# Patient Record
Sex: FEMALE | Race: WHITE | NOT HISPANIC OR LATINO
[De-identification: names, ages, dates, MRNs, and addresses within clinical notes are randomized per-mention and may not be internally consistent; named-entity substitution may affect disease eponyms.]

---

## 2019-02-21 ENCOUNTER — RESULT REVIEW (OUTPATIENT)
Age: 39
End: 2019-02-21

## 2021-03-26 ENCOUNTER — RESULT REVIEW (OUTPATIENT)
Age: 41
End: 2021-03-26

## 2021-04-13 ENCOUNTER — APPOINTMENT (OUTPATIENT)
Dept: HEART AND VASCULAR | Facility: CLINIC | Age: 41
End: 2021-04-13
Payer: COMMERCIAL

## 2021-04-13 ENCOUNTER — NON-APPOINTMENT (OUTPATIENT)
Age: 41
End: 2021-04-13

## 2021-04-13 VITALS
SYSTOLIC BLOOD PRESSURE: 128 MMHG | RESPIRATION RATE: 14 BRPM | DIASTOLIC BLOOD PRESSURE: 88 MMHG | OXYGEN SATURATION: 98 % | HEART RATE: 80 BPM | TEMPERATURE: 98.2 F | WEIGHT: 293 LBS | BODY MASS INDEX: 41.95 KG/M2 | HEIGHT: 70 IN

## 2021-04-13 DIAGNOSIS — Z87.42 PERSONAL HISTORY OF OTHER DISEASES OF THE FEMALE GENITAL TRACT: ICD-10-CM

## 2021-04-13 DIAGNOSIS — R03.0 ELEVATED BLOOD-PRESSURE READING, W/OUT DIAGNOSIS OF HYPERTENSION: ICD-10-CM

## 2021-04-13 DIAGNOSIS — Z86.39 PERSONAL HISTORY OF OTHER ENDOCRINE, NUTRITIONAL AND METABOLIC DISEASE: ICD-10-CM

## 2021-04-13 DIAGNOSIS — F41.9 ANXIETY DISORDER, UNSPECIFIED: ICD-10-CM

## 2021-04-13 DIAGNOSIS — R06.00 DYSPNEA, UNSPECIFIED: ICD-10-CM

## 2021-04-13 DIAGNOSIS — Z78.9 OTHER SPECIFIED HEALTH STATUS: ICD-10-CM

## 2021-04-13 DIAGNOSIS — R00.2 PALPITATIONS: ICD-10-CM

## 2021-04-13 PROCEDURE — 99072 ADDL SUPL MATRL&STAF TM PHE: CPT

## 2021-04-13 PROCEDURE — 99204 OFFICE O/P NEW MOD 45 MIN: CPT

## 2021-04-13 PROCEDURE — 93246 EXT ECG>7D<15D RECORDING: CPT

## 2021-04-13 PROCEDURE — 93000 ELECTROCARDIOGRAM COMPLETE: CPT | Mod: 59

## 2021-04-13 RX ORDER — LEVOTHYROXINE SODIUM 0.14 MG/1
137 TABLET ORAL
Qty: 90 | Refills: 0 | Status: ACTIVE | COMMUNITY
Start: 2021-01-29

## 2021-04-13 NOTE — PHYSICAL EXAM
[General Appearance - Well Developed] : well developed [Normal Appearance] : normal appearance [Well Groomed] : well groomed [General Appearance - Well Nourished] : well nourished [No Deformities] : no deformities [General Appearance - In No Acute Distress] : no acute distress [Normal Oral Mucosa] : normal oral mucosa [No Oral Pallor] : no oral pallor [No Oral Cyanosis] : no oral cyanosis [Heart Rate And Rhythm] : heart rate and rhythm were normal [Heart Sounds] : normal S1 and S2 [Murmurs] : no murmurs present [Respiration, Rhythm And Depth] : normal respiratory rhythm and effort [Exaggerated Use Of Accessory Muscles For Inspiration] : no accessory muscle use [Auscultation Breath Sounds / Voice Sounds] : lungs were clear to auscultation bilaterally [Abdomen Soft] : soft [Abdomen Tenderness] : non-tender [] : no hepato-splenomegaly [Abdomen Mass (___ Cm)] : no abdominal mass palpated [Oriented To Time, Place, And Person] : oriented to person, place, and time [Affect] : the affect was normal [Mood] : the mood was normal [No Anxiety] : not feeling anxious

## 2021-04-13 NOTE — DISCUSSION/SUMMARY
[FreeTextEntry1] : 40 year old F with history of Hypothyroidism, PCOS, Obesity, Anxiety here for evaluation of palpitations. She gets them intermittently. Gets occasional dyspnea on exertion.\par - Check TSH and labs\par - Stress echocardiogram\par - Ziopatch (placed on patient today). \par - Return after testing completed

## 2021-04-13 NOTE — REASON FOR VISIT
[Initial Evaluation] : an initial evaluation of [Palpitations] : palpitations [FreeTextEntry1] : 40 year old F with history of Hypothyroidism, PCOS, Obesity, Anxiety here for evaluation of palpitations. She gets them intermittently. Gets occasional dyspnea on exertion. No chest pain. Exercises on the peloton 3 times a week. \par \par EKG today NSR without STT abnormalities.

## 2021-04-27 ENCOUNTER — TRANSCRIPTION ENCOUNTER (OUTPATIENT)
Age: 41
End: 2021-04-27

## 2021-05-05 ENCOUNTER — NON-APPOINTMENT (OUTPATIENT)
Age: 41
End: 2021-05-05

## 2021-05-26 ENCOUNTER — APPOINTMENT (OUTPATIENT)
Dept: HEART AND VASCULAR | Facility: CLINIC | Age: 41
End: 2021-05-26
Payer: COMMERCIAL

## 2021-05-26 VITALS
HEART RATE: 76 BPM | DIASTOLIC BLOOD PRESSURE: 84 MMHG | SYSTOLIC BLOOD PRESSURE: 126 MMHG | BODY MASS INDEX: 41.95 KG/M2 | TEMPERATURE: 98 F | HEIGHT: 70 IN | WEIGHT: 293 LBS | OXYGEN SATURATION: 98 %

## 2021-05-26 PROCEDURE — 93320 DOPPLER ECHO COMPLETE: CPT

## 2021-05-26 PROCEDURE — 99072 ADDL SUPL MATRL&STAF TM PHE: CPT

## 2021-05-26 PROCEDURE — 93351 STRESS TTE COMPLETE: CPT

## 2021-05-26 PROCEDURE — 93325 DOPPLER ECHO COLOR FLOW MAPG: CPT

## 2021-05-27 ENCOUNTER — NON-APPOINTMENT (OUTPATIENT)
Age: 41
End: 2021-05-27

## 2021-05-27 LAB
ALBUMIN SERPL ELPH-MCNC: 4.4 G/DL
ALP BLD-CCNC: 81 U/L
ALT SERPL-CCNC: 21 U/L
ANION GAP SERPL CALC-SCNC: 15 MMOL/L
AST SERPL-CCNC: 17 U/L
BASOPHILS # BLD AUTO: 0.07 K/UL
BASOPHILS NFR BLD AUTO: 1 %
BILIRUB SERPL-MCNC: 0.3 MG/DL
BUN SERPL-MCNC: 12 MG/DL
CALCIUM SERPL-MCNC: 9.6 MG/DL
CHLORIDE SERPL-SCNC: 102 MMOL/L
CHOLEST SERPL-MCNC: 215 MG/DL
CO2 SERPL-SCNC: 22 MMOL/L
CREAT SERPL-MCNC: 0.6 MG/DL
EOSINOPHIL # BLD AUTO: 0.41 K/UL
EOSINOPHIL NFR BLD AUTO: 5.8 %
GLUCOSE SERPL-MCNC: 92 MG/DL
HCT VFR BLD CALC: 43.4 %
HDLC SERPL-MCNC: 66 MG/DL
HGB BLD-MCNC: 13.5 G/DL
IMM GRANULOCYTES NFR BLD AUTO: 0.4 %
LDLC SERPL CALC-MCNC: 125 MG/DL
LYMPHOCYTES # BLD AUTO: 2.1 K/UL
LYMPHOCYTES NFR BLD AUTO: 29.5 %
MAN DIFF?: NORMAL
MCHC RBC-ENTMCNC: 28.7 PG
MCHC RBC-ENTMCNC: 31.1 GM/DL
MCV RBC AUTO: 92.3 FL
MONOCYTES # BLD AUTO: 0.62 K/UL
MONOCYTES NFR BLD AUTO: 8.7 %
NEUTROPHILS # BLD AUTO: 3.9 K/UL
NEUTROPHILS NFR BLD AUTO: 54.6 %
NONHDLC SERPL-MCNC: 150 MG/DL
PLATELET # BLD AUTO: 273 K/UL
POTASSIUM SERPL-SCNC: 3.9 MMOL/L
PROT SERPL-MCNC: 7.1 G/DL
RBC # BLD: 4.7 M/UL
RBC # FLD: 13.3 %
SODIUM SERPL-SCNC: 139 MMOL/L
TRIGL SERPL-MCNC: 122 MG/DL
TSH SERPL-ACNC: 1.75 UIU/ML
WBC # FLD AUTO: 7.13 K/UL

## 2021-05-28 ENCOUNTER — NON-APPOINTMENT (OUTPATIENT)
Age: 41
End: 2021-05-28

## 2021-06-08 ENCOUNTER — APPOINTMENT (OUTPATIENT)
Dept: HEART AND VASCULAR | Facility: CLINIC | Age: 41
End: 2021-06-08
Payer: COMMERCIAL

## 2021-06-08 VITALS
RESPIRATION RATE: 16 BRPM | BODY MASS INDEX: 41.95 KG/M2 | SYSTOLIC BLOOD PRESSURE: 122 MMHG | TEMPERATURE: 97.6 F | WEIGHT: 293 LBS | DIASTOLIC BLOOD PRESSURE: 76 MMHG | HEIGHT: 70 IN | OXYGEN SATURATION: 98 % | HEART RATE: 70 BPM

## 2021-06-08 PROCEDURE — 99214 OFFICE O/P EST MOD 30 MIN: CPT

## 2021-06-08 PROCEDURE — 99072 ADDL SUPL MATRL&STAF TM PHE: CPT

## 2021-06-08 NOTE — DISCUSSION/SUMMARY
[FreeTextEntry1] : 40 year old F with history of Hypothyroidism, PCOS, Obesity, Anxiety with palpitations and dyspnea. Abnormal SE with basal inferoseptal region WMA post exercise. One small territory with atypical symptoms. \par - Will continue to monitor for now. Interpretation may be secondary to technically limited study. \par - will monitor symptoms. Reassurance provided for palpitations. \par - Weight loss goal of 15 lbs in 3 months. \par - diet/weight loss/exercise. \par - Return in 3 months

## 2021-06-08 NOTE — REVIEW OF SYSTEMS
[Negative] : Heme/Lymph [FreeTextEntry2] : except as above.  [FreeTextEntry5] : except as above.  [FreeTextEntry6] : except as above.

## 2021-06-08 NOTE — REASON FOR VISIT
[Initial Evaluation] : an initial evaluation of [Palpitations] : palpitations [FreeTextEntry1] : 40 year old F with history of Hypothyroidism, PCOS, Obesity, Anxiety here for followup for workup for palpitations. She gets them intermittently. Gets occasional dyspnea on exertion. No chest pain. Exercises on the peloton 3 times a week. she has lost 3 lbs intentionally since May 3rd. \par \par EKG 04/13/2021: NSR without STT abnormalities. \par \par Labs May 26 2021: TSH 0.75 (nl); CBC WNL; Total chol 215; ; ; HDL 66; CMP WNL\par ACTH 33.6; Cortisol 15.1\par \par May 26 2021: Phillip protocol 7 min; reached 101% of MPHR, Delayed HRR; No EKG changes. + WMA in basal inferoseptal region. \par \par Holter Zio 5 days 04/2021: Average HR 82 bpm with rare PVC and APC\par Resting echo: Trace MR, LVEF 60%; Trace TR/GA\par

## 2021-09-28 ENCOUNTER — APPOINTMENT (OUTPATIENT)
Dept: HEART AND VASCULAR | Facility: CLINIC | Age: 41
End: 2021-09-28
Payer: COMMERCIAL

## 2021-09-28 VITALS
SYSTOLIC BLOOD PRESSURE: 122 MMHG | HEART RATE: 84 BPM | TEMPERATURE: 97.2 F | DIASTOLIC BLOOD PRESSURE: 76 MMHG | OXYGEN SATURATION: 99 % | BODY MASS INDEX: 41.95 KG/M2 | WEIGHT: 293 LBS | HEIGHT: 70 IN | RESPIRATION RATE: 16 BRPM

## 2021-09-28 DIAGNOSIS — Z00.00 ENCOUNTER FOR GENERAL ADULT MEDICAL EXAMINATION W/OUT ABNORMAL FINDINGS: ICD-10-CM

## 2021-09-28 PROCEDURE — 99214 OFFICE O/P EST MOD 30 MIN: CPT

## 2021-09-28 PROCEDURE — 99072 ADDL SUPL MATRL&STAF TM PHE: CPT

## 2021-09-28 RX ORDER — METFORMIN ER 500 MG 500 MG/1
500 TABLET ORAL
Qty: 60 | Refills: 0 | Status: DISCONTINUED | COMMUNITY
Start: 2021-03-08 | End: 2021-09-28

## 2021-09-28 NOTE — DISCUSSION/SUMMARY
[FreeTextEntry1] : 40 year old F with history of Hypothyroidism, PCOS, Obesity, Anxiety with palpitations and dyspnea. Abnormal SE with basal inferoseptal region WMA post exercise. One small territory with atypical symptoms. \par - Will continue to monitor for now. Interpretation may be secondary to technically limited study. \par - will monitor symptoms. Reassurance provided for palpitations. \par - Weight loss goal of 15 lbs in 3 months. Will check labs today (fasting). Will try to find Nutritionist for her. \par - diet/weight loss/exercise. \par - Return in 6 months

## 2021-09-28 NOTE — REASON FOR VISIT
[Initial Evaluation] : an initial evaluation of [Palpitations] : palpitations [FreeTextEntry1] : 40 year old F with history of Hypothyroidism, PCOS, Obesity, Anxiety here for followup. No chest pain. Exercises on the peloton 3 times a week. She is no longer taking Metformin as she feels it didn’t help with weight loss. She has not had any weight loss. \par \par EKG 04/13/2021: NSR without STT abnormalities. \par \par Labs May 26 2021: TSH 0.75 (nl); CBC WNL; Total chol 215; ; ; HDL 66; CMP WNL\par ACTH 33.6; Cortisol 15.1\par \par May 26 2021: Phillip protocol 7 min; reached 101% of MPHR, Delayed HRR; No EKG changes. + WMA in basal inferoseptal region. \par \par Holter Zio 5 days 04/2021: Average HR 82 bpm with rare PVC and APC\par Resting echo: Trace MR, LVEF 60%; Trace TR/CA\par

## 2021-09-29 ENCOUNTER — NON-APPOINTMENT (OUTPATIENT)
Age: 41
End: 2021-09-29

## 2021-09-29 LAB
ALBUMIN SERPL ELPH-MCNC: 4.5 G/DL
ALP BLD-CCNC: 92 U/L
ALT SERPL-CCNC: 16 U/L
ANION GAP SERPL CALC-SCNC: 13 MMOL/L
AST SERPL-CCNC: 14 U/L
BASOPHILS # BLD AUTO: 0.06 K/UL
BASOPHILS NFR BLD AUTO: 1 %
BILIRUB SERPL-MCNC: 0.2 MG/DL
BUN SERPL-MCNC: 12 MG/DL
CALCIUM SERPL-MCNC: 9.3 MG/DL
CHLORIDE SERPL-SCNC: 104 MMOL/L
CHOLEST SERPL-MCNC: 201 MG/DL
CO2 SERPL-SCNC: 23 MMOL/L
CREAT SERPL-MCNC: 0.59 MG/DL
EOSINOPHIL # BLD AUTO: 0.44 K/UL
EOSINOPHIL NFR BLD AUTO: 7.4 %
GLUCOSE SERPL-MCNC: 106 MG/DL
HCT VFR BLD CALC: 42.3 %
HDLC SERPL-MCNC: 68 MG/DL
HGB BLD-MCNC: 13 G/DL
IMM GRANULOCYTES NFR BLD AUTO: 0.7 %
LDLC SERPL CALC-MCNC: 115 MG/DL
LYMPHOCYTES # BLD AUTO: 1.73 K/UL
LYMPHOCYTES NFR BLD AUTO: 29 %
MAN DIFF?: NORMAL
MCHC RBC-ENTMCNC: 28.1 PG
MCHC RBC-ENTMCNC: 30.7 GM/DL
MCV RBC AUTO: 91.4 FL
MONOCYTES # BLD AUTO: 0.49 K/UL
MONOCYTES NFR BLD AUTO: 8.2 %
NEUTROPHILS # BLD AUTO: 3.2 K/UL
NEUTROPHILS NFR BLD AUTO: 53.7 %
NONHDLC SERPL-MCNC: 133 MG/DL
PLATELET # BLD AUTO: 258 K/UL
POTASSIUM SERPL-SCNC: 4.3 MMOL/L
PROT SERPL-MCNC: 7 G/DL
RBC # BLD: 4.63 M/UL
RBC # FLD: 14.6 %
SODIUM SERPL-SCNC: 140 MMOL/L
TRIGL SERPL-MCNC: 90 MG/DL
WBC # FLD AUTO: 5.96 K/UL

## 2022-03-29 ENCOUNTER — APPOINTMENT (OUTPATIENT)
Dept: HEART AND VASCULAR | Facility: CLINIC | Age: 42
End: 2022-03-29
Payer: COMMERCIAL

## 2022-03-29 VITALS
HEIGHT: 70 IN | BODY MASS INDEX: 41.95 KG/M2 | SYSTOLIC BLOOD PRESSURE: 108 MMHG | TEMPERATURE: 97 F | OXYGEN SATURATION: 98 % | HEART RATE: 76 BPM | WEIGHT: 293 LBS | RESPIRATION RATE: 16 BRPM | DIASTOLIC BLOOD PRESSURE: 76 MMHG

## 2022-03-29 PROCEDURE — 99214 OFFICE O/P EST MOD 30 MIN: CPT

## 2022-03-29 PROCEDURE — 99072 ADDL SUPL MATRL&STAF TM PHE: CPT

## 2022-03-29 NOTE — REASON FOR VISIT
[Follow-Up - Clinic] : a clinic follow-up of [Palpitations] : palpitations [FreeTextEntry1] : 41 year old F with history of Hypothyroidism, PCOS, Obesity, Anxiety here for followup. No chest pain. Still Exercises on the peloton 3 times a week. She admits to being relatively sedentary despite the Peloton exercises. Stable with weight. \par \par EKG 04/13/2021: NSR without STT abnormalities. \par \par Labs 09/29/2021: CBC WNL; TG 90; ; Total chol 201; HDL 68; CMP WNL/\par Labs May 26 2021: TSH 0.75 (nl); CBC WNL; Total chol 215; ; ; HDL 66; CMP WNL\par ACTH 33.6; Cortisol 15.1\par \par May 26 2021: Phillip protocol 7 min; reached 101% of MPHR, Delayed HRR; No EKG changes. + WMA in basal inferoseptal region. \par \par Holter Zio 5 days 04/2021: Average HR 82 bpm with rare PVC and APC\par Resting echo: Trace MR, LVEF 60%; Trace TR/[place]\par

## 2022-03-29 NOTE — DISCUSSION/SUMMARY
[FreeTextEntry1] : 41 year old F with history of Hypothyroidism, PCOS, Obesity, Anxiety with palpitations and dyspnea. Abnormal SE with basal inferoseptal region WMA post exercise. One small territory with atypical symptoms. \par - Will continue to monitor for now. Interpretation may be secondary to technically limited study. \par - will monitor symptoms. Reassurance provided for palpitations. \par - Weight loss goal of 15 lbs in 3 months. Will check labs next visit. \par - diet/weight loss/exercise. \par - Return in 6 months

## 2022-07-12 ENCOUNTER — RESULT REVIEW (OUTPATIENT)
Age: 42
End: 2022-07-12

## 2022-10-11 ENCOUNTER — APPOINTMENT (OUTPATIENT)
Dept: HEART AND VASCULAR | Facility: CLINIC | Age: 42
End: 2022-10-11

## 2022-10-11 VITALS
BODY MASS INDEX: 41.95 KG/M2 | TEMPERATURE: 97.6 F | SYSTOLIC BLOOD PRESSURE: 118 MMHG | RESPIRATION RATE: 16 BRPM | HEART RATE: 68 BPM | HEIGHT: 70 IN | DIASTOLIC BLOOD PRESSURE: 84 MMHG | OXYGEN SATURATION: 98 % | WEIGHT: 293 LBS

## 2022-10-11 PROCEDURE — 99214 OFFICE O/P EST MOD 30 MIN: CPT

## 2022-10-11 NOTE — DISCUSSION/SUMMARY
[FreeTextEntry1] : 41 year old F with history of Hypothyroidism, PCOS, Obesity, Anxiety with palpitations and dyspnea. Abnormal SE with basal inferoseptal region WMA post exercise. One small territory Asx. Will continue to monitor for symptoms prior to doing further testing. \par - Counseling on diet and exercise. \par - Nutrition Consult referral \par - Will check labs today. \par - diet/weight loss/exercise. \par - Return in 6 months

## 2022-10-11 NOTE — REASON FOR VISIT
[Follow-Up - Clinic] : a clinic follow-up of [Palpitations] : palpitations [FreeTextEntry1] : 41 year old F with history of Hypothyroidism, PCOS, Obesity, Anxiety here for routine 6 month followup. Had Covid in May 2022. Changed jobs and now does more walking in [facility].  Still Exercises on the peloton. Has been doing intermittent fasting. \par \par EKG 04/13/2021: NSR without STT abnormalities. \par \par Labs 09/29/2021: CBC WNL; TG 90; ; Total chol 201; HDL 68; CMP WNL/\par Labs May 26 2021: TSH 0.75 (nl); CBC WNL; Total chol 215; ; ; HDL 66; CMP WNL\par ACTH 33.6; Cortisol 15.1\par \par May 26 2021: Phillip protocol 7 min; reached 101% of MPHR, Delayed HRR; No EKG changes. + WMA in basal inferoseptal region. \par \par Holter Zio 5 days 04/2021: Average HR 82 bpm with rare PVC and APC\par Resting echo: Trace MR, LVEF 60%; Trace TR/[place]\par

## 2022-10-12 LAB
ALBUMIN SERPL ELPH-MCNC: 4.5 G/DL
ALP BLD-CCNC: 90 U/L
ALT SERPL-CCNC: 21 U/L
ANION GAP SERPL CALC-SCNC: 14 MMOL/L
AST SERPL-CCNC: 20 U/L
BASOPHILS # BLD AUTO: 0.07 K/UL
BASOPHILS NFR BLD AUTO: 1 %
BILIRUB SERPL-MCNC: 0.4 MG/DL
BUN SERPL-MCNC: 14 MG/DL
CALCIUM SERPL-MCNC: 9.7 MG/DL
CHLORIDE SERPL-SCNC: 101 MMOL/L
CHOLEST SERPL-MCNC: 207 MG/DL
CO2 SERPL-SCNC: 24 MMOL/L
CREAT SERPL-MCNC: 0.61 MG/DL
EGFR: 116 ML/MIN/1.73M2
EOSINOPHIL # BLD AUTO: 0.42 K/UL
EOSINOPHIL NFR BLD AUTO: 6.1 %
GLUCOSE SERPL-MCNC: 86 MG/DL
HCT VFR BLD CALC: 44.5 %
HDLC SERPL-MCNC: 65 MG/DL
HGB BLD-MCNC: 14.2 G/DL
IMM GRANULOCYTES NFR BLD AUTO: 0.7 %
LDLC SERPL CALC-MCNC: 125 MG/DL
LYMPHOCYTES # BLD AUTO: 1.69 K/UL
LYMPHOCYTES NFR BLD AUTO: 24.5 %
MAN DIFF?: NORMAL
MCHC RBC-ENTMCNC: 29.3 PG
MCHC RBC-ENTMCNC: 31.9 GM/DL
MCV RBC AUTO: 91.8 FL
MONOCYTES # BLD AUTO: 0.53 K/UL
MONOCYTES NFR BLD AUTO: 7.7 %
NEUTROPHILS # BLD AUTO: 4.15 K/UL
NEUTROPHILS NFR BLD AUTO: 60 %
NONHDLC SERPL-MCNC: 143 MG/DL
PLATELET # BLD AUTO: 232 K/UL
POTASSIUM SERPL-SCNC: 4 MMOL/L
PROT SERPL-MCNC: 7.7 G/DL
RBC # BLD: 4.85 M/UL
RBC # FLD: 13.3 %
SODIUM SERPL-SCNC: 139 MMOL/L
TRIGL SERPL-MCNC: 87 MG/DL
WBC # FLD AUTO: 6.91 K/UL

## 2022-10-17 ENCOUNTER — NON-APPOINTMENT (OUTPATIENT)
Age: 42
End: 2022-10-17

## 2023-05-16 ENCOUNTER — APPOINTMENT (OUTPATIENT)
Dept: HEART AND VASCULAR | Facility: CLINIC | Age: 43
End: 2023-05-16

## 2023-07-17 ENCOUNTER — TRANSCRIPTION ENCOUNTER (OUTPATIENT)
Age: 43
End: 2023-07-17

## 2023-11-07 ENCOUNTER — NON-APPOINTMENT (OUTPATIENT)
Age: 43
End: 2023-11-07

## 2023-11-07 ENCOUNTER — APPOINTMENT (OUTPATIENT)
Dept: HEART AND VASCULAR | Facility: CLINIC | Age: 43
End: 2023-11-07
Payer: COMMERCIAL

## 2023-11-07 VITALS — BODY MASS INDEX: 41.8 KG/M2 | WEIGHT: 292 LBS | HEIGHT: 70 IN

## 2023-11-07 VITALS
OXYGEN SATURATION: 98 % | HEART RATE: 80 BPM | SYSTOLIC BLOOD PRESSURE: 116 MMHG | DIASTOLIC BLOOD PRESSURE: 89 MMHG | RESPIRATION RATE: 16 BRPM | HEIGHT: 70 IN

## 2023-11-07 DIAGNOSIS — R94.31 ABNORMAL ELECTROCARDIOGRAM [ECG] [EKG]: ICD-10-CM

## 2023-11-07 PROCEDURE — 93000 ELECTROCARDIOGRAM COMPLETE: CPT

## 2023-11-07 PROCEDURE — 99214 OFFICE O/P EST MOD 30 MIN: CPT

## 2023-11-09 RX ORDER — METOPROLOL SUCCINATE 100 MG/1
100 TABLET, EXTENDED RELEASE ORAL
Qty: 2 | Refills: 0 | Status: ACTIVE | COMMUNITY
Start: 2023-11-08 | End: 1900-01-01

## 2023-11-29 ENCOUNTER — RESULT REVIEW (OUTPATIENT)
Age: 43
End: 2023-11-29

## 2023-12-01 ENCOUNTER — NON-APPOINTMENT (OUTPATIENT)
Age: 43
End: 2023-12-01

## 2024-01-04 ENCOUNTER — APPOINTMENT (OUTPATIENT)
Dept: HEART AND VASCULAR | Facility: CLINIC | Age: 44
End: 2024-01-04
Payer: COMMERCIAL

## 2024-01-04 PROCEDURE — 93306 TTE W/DOPPLER COMPLETE: CPT

## 2024-01-11 ENCOUNTER — NON-APPOINTMENT (OUTPATIENT)
Age: 44
End: 2024-01-11

## 2024-02-13 ENCOUNTER — APPOINTMENT (OUTPATIENT)
Dept: HEART AND VASCULAR | Facility: CLINIC | Age: 44
End: 2024-02-13
Payer: COMMERCIAL

## 2024-02-13 PROCEDURE — 99213 OFFICE O/P EST LOW 20 MIN: CPT

## 2024-02-13 NOTE — REASON FOR VISIT
[FreeTextEntry1] : Televideo visit requested as per patient. Televideo consent obtained. Patient is her home in [place]. Provider in [place], [place].   43 year old F with history of Hypothyroidism, PCOS, Obesity, Anxiety for followup. Since last visit, she no longer has palpitations. Denies chest pain, dyspnea, orthopnea, PND, adelaida.  She has lost weight through diet/exercise. Weight today is 285 lbs. Been seeing a Nutritionist and Trainer. Doing a Whole 30 food based diet. Recent testing including TTE/CCTA reviewed with her. She was found to have incidental findings in lung and liver. She had further evaluation with MR and was told she has liver hemangioma - benign finding. CT chest to be repeated for lung finding in a few weeks. Repeat MR in 6 months, to be done with PMD.   TTE 01/08/2024: Normal biventricular function. No significant valvular disease.  CCTA 11/29/2023: Zero calcium score. Significant motion of the vessels. Normal LM. Cannot evaluate a portion of the mid LAD, proximal LCX and distal RCA/RPDA and RPL and nonobstructive disease in the proximal/distal LAD and probably normal in the mid LCX. 1.4 cm hypervascular lesion in hepatic segment 3 which may represent a hypervascular liver mass or a hepatic artery aneurysm. Bilateral lower lobe pleural based nodular densities/nodules largest in the left lower lobe measuring 0.9 cm. Followup CT Chest in 3 months.    EKG 11/2023: NSR with NSST in leads V1-V3.  EKG 04/13/2021: NSR without STT abnormalities.   Labs 11/02/2023: Total chol 211; HDL 69; TG 65; ; TPO Ab 229; DHEAS 257 (slightly increased); Maddie 8 (low); CMP WNL; A1C 5.1; CBC WNL;  Labs 10/12/2022: Total chol 207; ; HDL 65; TG 87; CMP  WNL; CBC WNL Labs 09/29/2021: CBC WNL; TG 90; ; Total chol 201; HDL 68; CMP WNL/ Labs May 26 2021: TSH 0.75 (nl); CBC WNL; Total chol 215; ; ; HDL 66; CMP WNL ACTH 33.6; Cortisol 15.1  May 26 2021: Phillip protocol 7 min; reached 101% of MPHR, Delayed HRR; No EKG changes. + WMA in basal inferoseptal region.   Holter Zio 5 days 04/2021: Average HR 82 bpm with rare PVC and APC Resting echo: Trace MR, LVEF 60%; Trace TR/NM  43 year old F with history of Hypothyroidism, PCOS, Obesity, Anxiety  - Palpitaitons resolved.  - CVS results good - Zero calcium score. Grossly normal CCTA. Normal TTE  - To get liver and lung lesions followed up with CT/MR.  - Will retrive the MR abdomen done recently.  - Followup in 6 months.

## 2024-08-20 ENCOUNTER — APPOINTMENT (OUTPATIENT)
Dept: HEART AND VASCULAR | Facility: CLINIC | Age: 44
End: 2024-08-20

## 2024-09-04 ENCOUNTER — NON-APPOINTMENT (OUTPATIENT)
Age: 44
End: 2024-09-04

## 2024-09-05 ENCOUNTER — APPOINTMENT (OUTPATIENT)
Dept: HEART AND VASCULAR | Facility: CLINIC | Age: 44
End: 2024-09-05
Payer: COMMERCIAL

## 2024-09-05 VITALS
SYSTOLIC BLOOD PRESSURE: 118 MMHG | HEART RATE: 92 BPM | OXYGEN SATURATION: 98 % | BODY MASS INDEX: 40.37 KG/M2 | HEIGHT: 70 IN | RESPIRATION RATE: 16 BRPM | WEIGHT: 282 LBS | DIASTOLIC BLOOD PRESSURE: 82 MMHG

## 2024-09-05 DIAGNOSIS — F41.9 ANXIETY DISORDER, UNSPECIFIED: ICD-10-CM

## 2024-09-05 DIAGNOSIS — R06.09 OTHER FORMS OF DYSPNEA: ICD-10-CM

## 2024-09-05 DIAGNOSIS — E28.2 POLYCYSTIC OVARIAN SYNDROME: ICD-10-CM

## 2024-09-05 DIAGNOSIS — R94.39 ABNORMAL RESULT OF OTHER CARDIOVASCULAR FUNCTION STUDY: ICD-10-CM

## 2024-09-05 DIAGNOSIS — E66.01 MORBID (SEVERE) OBESITY DUE TO EXCESS CALORIES: ICD-10-CM

## 2024-09-05 PROCEDURE — 99213 OFFICE O/P EST LOW 20 MIN: CPT

## 2024-09-05 RX ORDER — ASCORBIC ACID 500 MG
TABLET ORAL
Refills: 0 | Status: ACTIVE | COMMUNITY

## 2024-09-05 RX ORDER — VITAMIN B COMPLEX
CAPSULE ORAL
Refills: 0 | Status: ACTIVE | COMMUNITY

## 2024-09-05 RX ORDER — ZINC SULFATE TAB 220 MG (50 MG ZINC EQUIVALENT) 220 (50 ZN) MG
TAB ORAL
Refills: 0 | Status: ACTIVE | COMMUNITY

## 2024-09-05 RX ORDER — CHROMIUM 200 MCG
TABLET ORAL
Refills: 0 | Status: ACTIVE | COMMUNITY

## 2024-09-05 RX ORDER — BACILLUS COAGULANS/INULIN 1B-250 MG
CAPSULE ORAL
Refills: 0 | Status: ACTIVE | COMMUNITY

## 2024-09-05 NOTE — HISTORY OF PRESENT ILLNESS
[FreeTextEntry1] : 44 yo F transitioning care from Dr Kapoor.   She has hx of PCOS, obesity, anxiety, hypothyroidism and was evaluated for chest pain and palpitations in the past.  She had a mildly abnormal stress test in 2021.  Had a CCTA that was suboptimal due to motion and showed CA score 0 with probably mild disease.  Her cholesterol was high in the past.  She has lost over 20 lbs the past year and her cholesterol has come down to borderline normal on recent labs with PMD at [facility].  She has been exercising regularly and does not get sob as easily and following a heart healthy diet.   No chest pain with exertion, no more palpitations, dizziness or near syncope.

## 2024-09-05 NOTE — DISCUSSION/SUMMARY
[FreeTextEntry1] : Has lost 20 lbs on diet and exercise.  Breathing is better.   Cholesterol is in high normal range now on recent labs with PMD.  Encouraged to continue diet and exercise. She states she may have plateaued on her wt loss.   Will reassess in 6 months and consider GLP-1 inhibitors.

## 2024-09-05 NOTE — PHYSICAL EXAM
[Well Developed] : well developed . [Well Nourished] : well nourished [No Acute Distress] : no acute distress [Normal Conjunctiva] : normal conjunctiva [Normal Venous Pressure] : normal venous pressure [No Carotid Bruit] : no carotid bruit [Normal S1, S2] : normal S1, S2 [No Murmur] : no murmur [No Rub] : no rub [No Gallop] : no gallop [Clear Lung Fields] : clear lung fields [Good Air Entry] : good air entry [No Respiratory Distress] : no respiratory distress  [Soft] : abdomen soft [Non Tender] : non-tender [Normal Gait] : normal gait [No Edema] : no edema [Normal Radial B/L] : normal radial B/L [No Rash] : no rash [No Focal Deficits] : no focal deficits [Normal Speech] : normal speech [Normal memory] : normal memory [de-identified] : Obese

## 2025-03-06 ENCOUNTER — APPOINTMENT (OUTPATIENT)
Dept: HEART AND VASCULAR | Facility: CLINIC | Age: 45
End: 2025-03-06

## 2025-06-24 ENCOUNTER — APPOINTMENT (OUTPATIENT)
Dept: OBGYN | Facility: CLINIC | Age: 45
End: 2025-06-24
Payer: COMMERCIAL

## 2025-06-24 PROCEDURE — 76830 TRANSVAGINAL US NON-OB: CPT | Mod: 26
